# Patient Record
Sex: MALE | Race: BLACK OR AFRICAN AMERICAN | Employment: UNEMPLOYED | URBAN - METROPOLITAN AREA
[De-identification: names, ages, dates, MRNs, and addresses within clinical notes are randomized per-mention and may not be internally consistent; named-entity substitution may affect disease eponyms.]

---

## 2024-07-23 ENCOUNTER — APPOINTMENT (OUTPATIENT)
Dept: GENERAL RADIOLOGY | Facility: HOSPITAL | Age: 49
End: 2024-07-23
Attending: EMERGENCY MEDICINE
Payer: MEDICAID

## 2024-07-23 ENCOUNTER — HOSPITAL ENCOUNTER (EMERGENCY)
Facility: HOSPITAL | Age: 49
Discharge: HOME | End: 2024-07-23
Attending: EMERGENCY MEDICINE
Payer: MEDICAID

## 2024-07-23 VITALS
BODY MASS INDEX: 30.38 KG/M2 | RESPIRATION RATE: 13 BRPM | SYSTOLIC BLOOD PRESSURE: 160 MMHG | WEIGHT: 217 LBS | HEIGHT: 71 IN | OXYGEN SATURATION: 97 % | DIASTOLIC BLOOD PRESSURE: 84 MMHG | TEMPERATURE: 98 F | HEART RATE: 102 BPM

## 2024-07-23 DIAGNOSIS — R00.0 TACHYCARDIA: Primary | ICD-10-CM

## 2024-07-23 LAB
ALBUMIN SERPL-MCNC: 4.7 G/DL (ref 3.2–4.8)
ALBUMIN/GLOB SERPL: 2 {RATIO} (ref 1–2)
ALP LIVER SERPL-CCNC: 71 U/L
ALT SERPL-CCNC: 23 U/L
ANION GAP SERPL CALC-SCNC: 10 MMOL/L (ref 0–18)
AST SERPL-CCNC: 17 U/L (ref ?–34)
ATRIAL RATE: 109 BPM
BASOPHILS # BLD AUTO: 0.06 X10(3) UL (ref 0–0.2)
BASOPHILS NFR BLD AUTO: 0.3 %
BILIRUB SERPL-MCNC: 0.5 MG/DL (ref 0.3–1.2)
BUN BLD-MCNC: 10 MG/DL (ref 9–23)
BUN/CREAT SERPL: 8 (ref 10–20)
CALCIUM BLD-MCNC: 9.3 MG/DL (ref 8.7–10.4)
CHLORIDE SERPL-SCNC: 107 MMOL/L (ref 98–112)
CO2 SERPL-SCNC: 25 MMOL/L (ref 21–32)
CREAT BLD-MCNC: 1.25 MG/DL
DEPRECATED RDW RBC AUTO: 39.6 FL (ref 35.1–46.3)
EGFRCR SERPLBLD CKD-EPI 2021: 71 ML/MIN/1.73M2 (ref 60–?)
EOSINOPHIL # BLD AUTO: 0.06 X10(3) UL (ref 0–0.7)
EOSINOPHIL NFR BLD AUTO: 0.3 %
ERYTHROCYTE [DISTWIDTH] IN BLOOD BY AUTOMATED COUNT: 12.8 % (ref 11–15)
GLOBULIN PLAS-MCNC: 2.3 G/DL (ref 2–3.5)
GLUCOSE BLD-MCNC: 175 MG/DL (ref 70–99)
HCT VFR BLD AUTO: 43.2 %
HGB BLD-MCNC: 14.6 G/DL
IMM GRANULOCYTES # BLD AUTO: 0.06 X10(3) UL (ref 0–1)
IMM GRANULOCYTES NFR BLD: 0.3 %
LYMPHOCYTES # BLD AUTO: 10.54 X10(3) UL (ref 1–4)
LYMPHOCYTES NFR BLD AUTO: 54.7 %
MCH RBC QN AUTO: 28.5 PG (ref 26–34)
MCHC RBC AUTO-ENTMCNC: 33.8 G/DL (ref 31–37)
MCV RBC AUTO: 84.4 FL
MONOCYTES # BLD AUTO: 0.56 X10(3) UL (ref 0.1–1)
MONOCYTES NFR BLD AUTO: 2.9 %
NEUTROPHILS # BLD AUTO: 8 X10 (3) UL (ref 1.5–7.7)
NEUTROPHILS # BLD AUTO: 8 X10(3) UL (ref 1.5–7.7)
NEUTROPHILS NFR BLD AUTO: 41.5 %
OSMOLALITY SERPL CALC.SUM OF ELEC: 297 MOSM/KG (ref 275–295)
P AXIS: 56 DEGREES
P-R INTERVAL: 134 MS
PLATELET # BLD AUTO: 188 10(3)UL (ref 150–450)
POTASSIUM SERPL-SCNC: 4 MMOL/L (ref 3.5–5.1)
PROT SERPL-MCNC: 7 G/DL (ref 5.7–8.2)
Q-T INTERVAL: 312 MS
QRS DURATION: 84 MS
QTC CALCULATION (BEZET): 420 MS
R AXIS: 7 DEGREES
RBC # BLD AUTO: 5.12 X10(6)UL
SARS-COV-2 RNA RESP QL NAA+PROBE: NOT DETECTED
SODIUM SERPL-SCNC: 142 MMOL/L (ref 136–145)
T AXIS: 13 DEGREES
TROPONIN I SERPL HS-MCNC: 3 NG/L
VENTRICULAR RATE: 109 BPM
WBC # BLD AUTO: 19.3 X10(3) UL (ref 4–11)

## 2024-07-23 PROCEDURE — 99285 EMERGENCY DEPT VISIT HI MDM: CPT

## 2024-07-23 PROCEDURE — 96360 HYDRATION IV INFUSION INIT: CPT

## 2024-07-23 PROCEDURE — 71045 X-RAY EXAM CHEST 1 VIEW: CPT | Performed by: EMERGENCY MEDICINE

## 2024-07-23 PROCEDURE — 85025 COMPLETE CBC W/AUTO DIFF WBC: CPT | Performed by: EMERGENCY MEDICINE

## 2024-07-23 PROCEDURE — 80053 COMPREHEN METABOLIC PANEL: CPT | Performed by: EMERGENCY MEDICINE

## 2024-07-23 PROCEDURE — 93010 ELECTROCARDIOGRAM REPORT: CPT

## 2024-07-23 PROCEDURE — 85060 BLOOD SMEAR INTERPRETATION: CPT | Performed by: EMERGENCY MEDICINE

## 2024-07-23 PROCEDURE — 93005 ELECTROCARDIOGRAM TRACING: CPT

## 2024-07-23 PROCEDURE — 84484 ASSAY OF TROPONIN QUANT: CPT | Performed by: EMERGENCY MEDICINE

## 2024-07-23 RX ORDER — ROSUVASTATIN CALCIUM 10 MG/1
10 TABLET, COATED ORAL NIGHTLY
COMMUNITY

## 2024-07-23 RX ORDER — AMLODIPINE BESYLATE 10 MG/1
10 TABLET ORAL DAILY
COMMUNITY

## 2024-07-23 NOTE — CM/SW NOTE
0140:  SALINAS Kidd RN called requesting Lyft to be arranged for patient discharge. Per SALINAS Kidd RN patient does not have any money or anyone to pick him up. This ERCM confirmed with SALINAS Kidd RN patient able to ambulate safely independently. Per ARIELLE Kidd patient would like to be discharged back to the [facility] [place] located at [address] , [place] [address]. Per SALINAS Kidd RN patient is ready to discharge now. This ERCM informed SALINAS Kidd RN to please escort patient to [facility], inform [facility] Triage staff whom patient is and that this ERCM will be calling them with Lyft ETA and  details shortly. SALINAS Kidd RN v/u.    0158:  Lyft arranged - ETA 7MIN.    0200:  Max,ER-T in EM Triage informed of lyft ETA,  details and to please ensure patient gets into correct lyft upon it's arrival. Max,ER-T v/u.

## 2024-07-23 NOTE — ED QUICK NOTES
IV discontinued, monitor removed for patient to dress. Pt requesting a ride to the [employer] as that's where his car is parked. Called case management Briseyda who states that she will get him transport. Pt ambulatory to exit in no distress.

## 2024-07-23 NOTE — ED PROVIDER NOTES
Patient Seen in: [facility] Emergency Department    History     Chief Complaint   Patient presents with    Arrythmia/Palpitations       HPI    48-year-old male with past medical history significant for asthma, high blood pressure, CLL, presents to the ED for tachycardia.  Patient states that around 1030 tonight, he started noticing that his heart rate was racing.  Per EMS, they noticed his heart rate was as high as 135 bpm when they placed him on monitor.  He slowed on the way to the hospital.  Patient denies any chest pain, nausea, fevers, chills.  He states he has had a slight cough for the past 2 days and was very tired and slept the entire weekend.  He had some mild bodyaches.  No dysuria, hematuria, shortness of breath.    History from Independent Source: Initial history given by EMS as stated in [facility]    External Records Reviewed: Reviewed external records and patient was seen by cardiology on 15 July for similar symptoms.  He had an echocardiogram completed then that shows an EF of 55 to 60% with no other significant abnormalities.    History reviewed.   Past Medical History:    Asthma (HCC)    Essential hypertension    Leukemia, chronic lymphocytic (HCC)       History reviewed.   Past Surgical History:   Procedure Laterality Date    Nasal surg proc unlisted           Medications :  (Not in a hospital admission)       No family history on file.    Smoking Status:   Social History     Socioeconomic History    Marital status: Single   Tobacco Use    Smoking status: Never    Smokeless tobacco: Never   Vaping Use    Vaping status: Never Used   Substance and Sexual Activity    Alcohol use: Never    Drug use: Never       Constitutional and vital signs reviewed.      Social History and Family History elements reviewed from today, pertinent positives to the presenting problem noted.    Physical Exam     ED Triage Vitals [07/23/24 0017]   BP (!) 165/85   Pulse 114   Resp 20   Temp 98.2 °F (36.8 °C)   Temp src  Temporal   SpO2 97 %   O2 Device None (Room air)       Physical Exam   Constitutional: AAOx3, well nourished, NAD  HEENT: Normocephalic, PERRLA, MMM  CV: s1s2+, RRR, no m/r/g, normal distal pulses  Pulmonary/Chest: CTA b/l with no rales, wheezes.  No chest wall tenderness  Abdominal: Nontender.  Nondistended. Soft. Bowel sounds are normal.   Neck/Back:   :   Musculoskeletal: Normal range of motion. No deformity.   Neurological: Awake, alert. Normal reflexes. No cranial nerve deficit.    Skin: Skin is warm and dry. No rash noted. No erythema.   Psychiatric:      All measures to prevent infection transmission during my interaction with the patient were taken. The patient was already wearing a droplet mask on my arrival to the room. Personal protective equipment was worn throughout the duration of the exam.      ED Course        Labs Reviewed   COMP METABOLIC PANEL (14) - Abnormal; Notable for the following components:       Result Value    Glucose 175 (*)     BUN/CREA Ratio 8.0 (*)     Calculated Osmolality 297 (*)     All other components within normal limits   CBC W/ DIFFERENTIAL - Abnormal; Notable for the following components:    WBC 19.3 (*)     Neutrophil Absolute Prelim 8.00 (*)     All other components within normal limits   TROPONIN I HIGH SENSITIVITY - Normal   RAPID SARS-COV-2 BY PCR - Normal   CBC WITH DIFFERENTIAL WITH PLATELET    Narrative:     The following orders were created for panel order CBC With Differential With Platelet.                  Procedure                               Abnormality         Status                                     ---------                               -----------         ------                                     CBC W/ DIFFERENTIAL[[id number]]          Abnormal            Preliminary result                                           Please view results for these tests on the individual orders.   SCAN SLIDE   MD BLOOD SMEAR CONSULT   RAINBOW DRAW LAVENDER   RAINBOW  DRAW LIGHT GREEN   RAINBOW DRAW BLUE   RAINBOW DRAW GOLD     My Independent Interpretation of EKG (if performed): EKG    Rate, intervals and axes as noted on EKG Report.  Rate: 109 bpm  Rhythm: Sinus Rhythm  Reading: Sinus tachycardia, no acute ST changes, normal axis and intervals, no ectopy             Monitor Interpretation:   sinus tachycardia as interpreted by me.      Imaging Results Available and Reviewed while in ED: No results found.  ED Medications Administered:   Medications   sodium chloride 0.9 % IV bolus 1,000 mL (0 mL Intravenous Stopped 7/23/24 0143)             MDM     Vitals:    07/23/24 0030 07/23/24 0100 07/23/24 0115 07/23/24 0130   BP: (!) 165/85  151/76 160/84   Pulse: 112 108 97 102   Resp: 11 15 11 13   Temp:       TempSrc:       SpO2: 98% 99% 97% 97%   Weight:       Height:         *I personally reviewed and interpreted all ED vitals.    Independent Interpretation of Studies: I have independently reviewed patient's chest x-ray and there are no significant acute abnormalities    Social Determinants of Health:     Procedures:      Differential/MDM/Shared Decision Making: Differential Diagnosis includes dysrhythmia, ACS, PE, pneumothorax, viral syndrome, COVID, hypoglycemia, others.      The patient already has CLL, high blood pressure, asthma, high cholesterol, to contribute to the complexity of this ED evaluation.           Patient has remained in sinus rhythm in the ED.  His heart rates after a liter of fluid came down that 95 bpm.  I recommended that he follow-up with his cardiologist for heart monitor.  I do not believe he is having symptoms of PE or dysrhythmia at this time.  Discussed case with patient and he is comfortable with this discharge plan.  He states that he has had episodes of this for several months but they seem to be coming more frequently now.  He states now he seems to get 1 of these episodes once every 1 to 2 weeks when before he was getting them every few  months.      Condition upon leaving the department: Stable    Disposition and Plan     Clinical Impression:  1. Tachycardia        Disposition:  Discharge    Follow-up:  Myles Smith  [address]  [facility] [address] [phone]    Call in 2 day(s)        Medications Prescribed:  Current Discharge Medication List

## 2024-07-23 NOTE — ED INITIAL ASSESSMENT (HPI)
Pt presents from work for evaluation of tachycardia since 10pm.  Per pt, he experiences tachycardia related to one of his medications.